# Patient Record
Sex: FEMALE | Employment: UNEMPLOYED | URBAN - METROPOLITAN AREA
[De-identification: names, ages, dates, MRNs, and addresses within clinical notes are randomized per-mention and may not be internally consistent; named-entity substitution may affect disease eponyms.]

---

## 2023-10-08 PROBLEM — I10 HIGH BLOOD PRESSURE: Status: ACTIVE | Noted: 2023-10-08

## 2023-10-08 PROBLEM — C20 RECTAL CANCER (MULTI): Status: ACTIVE | Noted: 2023-10-08

## 2023-10-08 RX ORDER — LOSARTAN POTASSIUM AND HYDROCHLOROTHIAZIDE 12.5; 1 MG/1; MG/1
TABLET ORAL
COMMUNITY
Start: 2015-09-14

## 2023-10-09 ENCOUNTER — EVALUATION (OUTPATIENT)
Dept: OCCUPATIONAL THERAPY | Facility: CLINIC | Age: 63
End: 2023-10-09
Payer: COMMERCIAL

## 2023-10-09 DIAGNOSIS — M25.672 JOINT STIFFNESS OF BOTH ANKLES: ICD-10-CM

## 2023-10-09 DIAGNOSIS — M79.605 BILATERAL LEG AND FOOT PAIN: ICD-10-CM

## 2023-10-09 DIAGNOSIS — M79.672 BILATERAL LEG AND FOOT PAIN: ICD-10-CM

## 2023-10-09 DIAGNOSIS — M79.671 BILATERAL LEG AND FOOT PAIN: ICD-10-CM

## 2023-10-09 DIAGNOSIS — M79.604 BILATERAL LEG AND FOOT PAIN: ICD-10-CM

## 2023-10-09 DIAGNOSIS — M25.671 JOINT STIFFNESS OF BOTH ANKLES: ICD-10-CM

## 2023-10-09 DIAGNOSIS — I89.0 LYMPHEDEMA OF BOTH LOWER EXTREMITIES: Primary | ICD-10-CM

## 2023-10-09 PROCEDURE — 97166 OT EVAL MOD COMPLEX 45 MIN: CPT | Mod: GO

## 2023-10-09 PROCEDURE — 97535 SELF CARE MNGMENT TRAINING: CPT | Mod: GO

## 2023-10-09 ASSESSMENT — ACTIVITIES OF DAILY LIVING (ADL): HOME_MANAGEMENT_TIME_ENTRY: 15

## 2023-10-09 NOTE — LETTER
October 11, 2023     Patient: Elisa Ann   YOB: 1960   Date of Visit: 10/9/2023       To Whom It May Concern:    It is my medical opinion that Elisa Ann {Work release (duty restriction):20886}.    If you have any questions or concerns, please don't hesitate to call.         Sincerely,        Ratna Jones, OT    CC: No Recipients

## 2023-10-09 NOTE — PROGRESS NOTES
Occupational Therapy    Occupational Therapy Evaluation    Name: Elisa Ann  MRN: [id number]  : 1960  Date: 10/09/23  Visit #1   58 OT/PT VS/YR     Plan:    Goals: Goals set and discussed today.   By discharge pt will achieve the following goals:     -Demonstrate decreased swelling and softened tissue texture in BLEs upon visual inspection and palpation to increase safe functional mobility, ADLS. IADLS, and leisure activities.  -Decrease circumferential measurements bilateral lower extremity by 0.5-4.0 cm.  -Demonstrate increased knowledge with lymphedema skin care precautions to reduce the risk of infection and exacerbation.  -Demonstrate independence with the self manual lymph drainage (MLD) to enhance lymphatic flow and decongestion of trunk, both lower extremities.  -Demonstrate independence with self bandaging/compression techniques and independent understanding of the principles and theory of lymphatic compression.  -Be fit with and demonstrate good tolerance to bilateral lower extremity compression garment (to determine style, mmHg) when the patient is stable, at maximal decongestion.  -Demonstrate independence with donning/doffing garment and adherence to wear schedule, adaptive techniques as needed.  -Improve LLIS score by 7-14 points by discharge.  -Decrease pain, tightness lower extremities.  -Improve bilateral LE functional ROM and strength as needed for safe functional mobility.  -Demonstrate independence with home exercise program and compliance with lymphedema exercise precautions.        Intervention plan include: vasopneumatic device , ADLs, compression bandaging , edema control , education/instruction , garment measuring/fitting , home program , IADLs, manual lymphatic drainage , manual therapy , therapeutic activities and therapeutic exercises.   Frequency and duration: 1-2 time(s) a week, for 8-12 weeks.   Potential to achieve rehab goals is good.     Plan of care was developed with input  and agreement by the patient.      Assessment    Pt presents to occupational therapy today with with BLE lymphedema, leg heaviness/tightness/pain, joint stiffness, fatigue impacting her functional mobility, ADLS, IADLS, work and leisure activities.  Standardized testing and measures administered today, including LLIS, reveal that the patient has multiple impairments in body structures and functions, activity limitations, and participation restrictions.   The patient has personal factors and comorbidities that may serve as barriers affecting the plan of care, including h/o venous insufficiency, leg swelling, works full time.  Skilled OT services are warranted in order to realize measurable change in the above outcome measures and achieve improvements in the patient's functional status and individual goals. The patient verbalized understanding and is in agreement with all goals and plan of care.        Clinical Presentation: evolving with changing characteristics   Level of Complexity: moderate   Problems To Be Addressed: decreased ADL performance, decreased IADL performance, decreased play/leisure performance, decreased knowledge of precautions, decreased knowledge of HEP, edema, pain, decreased ROM/joint mobility, decreased strength, impaired sensation/sensibility and lymphedema.      Reason For Visit    Initial Evaluation, Evaluation and Treatment.   Reason for Referral: Lymphedema I89.0  Referred by Dr Russell Henley MD         Subjective      Lymphedema History   History of present illness. Pt presents to OT with BLE lymphedema, leg heaviness/tightness/pain, joint stiffness, fatigue impacting her functional mobility, ADLS, IADLS, work and leisure activities.   Pt reports h/o leg swelling since 2007, has worsened this summer with hot weather. Pt reports leg swelling was inherited. Pt wears knee high OTC compression stockings, though not containing swelling.  Will assess.  Medical history: h/o venous insufficiency,  "has had laser ablations per pt; OA knees; h/o rectal cancer, 2016, surgery with ileostomy, then reversal 2017; h/o bowel blockage with surgery 2020, no colostomy.   Hand Dominance: Right   Affected body part(s) are: bilateral lower extremities.   Pain Scale: Location: lower legs heavy, achy, tight, fatigue when on feet for extended amounts of time.  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   Living Environment, Social Support and Patient Characteristics: two-story condo, no stairs to enter, has 14 steps with railing between levels. Pt lives with her .  Bedroom: 2nd floor   Bathroom: 2nd floor  Pt walks without device.  Pt manages stairs with hand rail support, slower, regular pattern, was using step-to pattern.  Pt manages self cares, home care tasks, modified as needed.    Patient stated goal(s) for treatment include: reducing symptoms . \"Help somewhat with swelling.\"  Medical Screening:   Medical screening assessed   No overt signs of domestic/child or elder abuse .      Objective    Outcome Measures:   Lymphedema Life Impact Scale score: 22, no infections         ROM:   (Range of Motion in degrees)   Additional Information: Ankles tight, stiff.   Strength:   Additional Findings:   Grossly functional. Fatigue, leg heaviness.   Sensation:   Right Lower Extremity: intact   Left Lower Extremity: intact         Lower Extremity (Skin Appearance/Condition and Girth):   Dryness    Fibrotic edema distal lower legs  Pitting edema   Hyperkeratosis creases ankles, mild toes   Hyperpigmentation pink lower legs, dark spots/areas distal ant lower legs bilaterally, pt reports she was told these are pre-ulcer areas.  + Stemmer Sign bilateral 2nd toes   Additional Information: Firm, tight tissue texture distal lower legs, fibrotic, hyperkeratotic, creases.     Lower Extremity Girth Measurements:      RLE cm  Superior border of patella (SBP) 50.0  10 cm above SBP -  10cm below SBP 43.3  20cm below SBP " 45.3  30cm below SBP 38.5  35cm below SBP 33.6  Ankle lobule 32.0  Ankle 28.8  Forefoot 24.5    LLE cm  Superior border of patella (SBP) 47.5  10 cm above SBP -  10cm below SBP 42.0  20cm below SBP 44.4  30cm below SBP 34.9  35cm below SBP 31.5  Ankle lobule 32.6  Ankle 29.5  Forefoot 24.1         Treatment    Time in clinic started at 1415   Time in clinic ended at 1515   Total time in clinic is 60 minutes.   Total timed code time is 15 minutes.     Treatment Performed Today SELF CARE TX: Pt instructed on function of lymphatic system, causes of lymphedema, lymphedema tx/CDT-complete decongestive therapy which includes skin care, manual lymph drainage (MLD), compression and exercise. NLN hand out issued.   Evaluation Code: 45 min(s). 93657 - OT Eval Mod Complexity   Timed: 36864 Self Care Management Training, 1 unit(s), 15 min(s).

## 2023-10-09 NOTE — LETTER
October 11, 2023     Patient: Elisa Ann   YOB: 1960   Date of Visit: 10/9/2023       To Whom it May Concern:    Elisa Ann was seen in my clinic on 10/9/2023. She {Return to school/sport:83659}.    If you have any questions or concerns, please don't hesitate to call.         Sincerely,          Ratna Jones OT        CC: No Recipients

## 2023-10-10 PROBLEM — M79.605 BILATERAL LEG AND FOOT PAIN: Status: ACTIVE | Noted: 2023-10-10

## 2023-10-10 PROBLEM — M79.672 BILATERAL LEG AND FOOT PAIN: Status: ACTIVE | Noted: 2023-10-10

## 2023-10-10 PROBLEM — M25.671 JOINT STIFFNESS OF BOTH ANKLES: Status: ACTIVE | Noted: 2023-10-10

## 2023-10-10 PROBLEM — M79.604 BILATERAL LEG AND FOOT PAIN: Status: ACTIVE | Noted: 2023-10-10

## 2023-10-10 PROBLEM — M25.672 JOINT STIFFNESS OF BOTH ANKLES: Status: ACTIVE | Noted: 2023-10-10

## 2023-10-10 PROBLEM — I89.0 LYMPHEDEMA OF BOTH LOWER EXTREMITIES: Status: ACTIVE | Noted: 2023-10-10

## 2023-10-10 PROBLEM — M79.671 BILATERAL LEG AND FOOT PAIN: Status: ACTIVE | Noted: 2023-10-10

## 2023-10-10 ASSESSMENT — ENCOUNTER SYMPTOMS
DEPRESSION: 0
OCCASIONAL FEELINGS OF UNSTEADINESS: 0
LOSS OF SENSATION IN FEET: 0

## 2023-10-31 ENCOUNTER — APPOINTMENT (OUTPATIENT)
Dept: OCCUPATIONAL THERAPY | Facility: CLINIC | Age: 63
End: 2023-10-31
Payer: COMMERCIAL

## 2023-11-07 ENCOUNTER — APPOINTMENT (OUTPATIENT)
Dept: OCCUPATIONAL THERAPY | Facility: CLINIC | Age: 63
End: 2023-11-07
Payer: MEDICARE

## 2023-11-13 ENCOUNTER — TREATMENT (OUTPATIENT)
Dept: OCCUPATIONAL THERAPY | Facility: CLINIC | Age: 63
End: 2023-11-13
Payer: MEDICARE

## 2023-11-13 DIAGNOSIS — I89.0 LYMPHEDEMA: ICD-10-CM

## 2023-11-13 DIAGNOSIS — I89.0 LYMPHEDEMA OF BOTH LOWER EXTREMITIES: Primary | ICD-10-CM

## 2023-11-13 DIAGNOSIS — M79.605 BILATERAL LEG AND FOOT PAIN: ICD-10-CM

## 2023-11-13 DIAGNOSIS — M79.672 BILATERAL LEG AND FOOT PAIN: ICD-10-CM

## 2023-11-13 DIAGNOSIS — M25.672 JOINT STIFFNESS OF BOTH ANKLES: ICD-10-CM

## 2023-11-13 DIAGNOSIS — M25.671 JOINT STIFFNESS OF BOTH ANKLES: ICD-10-CM

## 2023-11-13 DIAGNOSIS — M62.81 GENERALIZED MUSCLE WEAKNESS: ICD-10-CM

## 2023-11-13 DIAGNOSIS — M79.604 BILATERAL LEG AND FOOT PAIN: ICD-10-CM

## 2023-11-13 DIAGNOSIS — M79.671 BILATERAL LEG AND FOOT PAIN: ICD-10-CM

## 2023-11-13 PROCEDURE — 97140 MANUAL THERAPY 1/> REGIONS: CPT | Mod: GO

## 2023-11-13 PROCEDURE — 97535 SELF CARE MNGMENT TRAINING: CPT | Mod: GO

## 2023-11-13 ASSESSMENT — ACTIVITIES OF DAILY LIVING (ADL): HOME_MANAGEMENT_TIME_ENTRY: 30

## 2023-11-13 NOTE — PROGRESS NOTES
Occupational Therapy    Occupational Therapy Treatment    Name: Elisa Ann  MRN: [id number]  : 1960  Date: 23  Visit #2 (#1 Medicare)    1. Lymphedema of both lower extremities        2. Lymphedema  Follow Up In Occupational Therapy      3. Bilateral leg and foot pain        4. Joint stiffness of both ankles        5. Generalized muscle weakness            Assessment:  BLE tissue texture softening post tx.  Pt reported her legs felt lighter.     Plan:  Continue OT 1-2x/week for trunk, BLE lymphedema CDT- complete decongestive therapy, to decrease swelling, pain, tightness, improve functional mobility, instruct home management strategies.   Continue per goals:  By discharge pt will achieve the following goals:     -Demonstrate decreased swelling and softened tissue texture in BLEs upon visual inspection and palpation to increase safe functional mobility, ADLS. IADLS, and leisure activities.  -Decrease circumferential measurements bilateral lower extremity by 0.5-4.0 cm.  -Demonstrate increased knowledge with lymphedema skin care precautions to reduce the risk of infection and exacerbation.  -Demonstrate independence with the self manual lymph drainage (MLD) to enhance lymphatic flow and decongestion of trunk, both lower extremities.  -Demonstrate independence with self bandaging/compression techniques and independent understanding of the principles and theory of lymphatic compression.  -Be fit with and demonstrate good tolerance to bilateral lower extremity compression garment (to determine style, mmHg) when the patient is stable, at maximal decongestion.  -Demonstrate independence with donning/doffing garment and adherence to wear schedule, adaptive techniques as needed.  -Improve LLIS score by 7-14 points by discharge.  -Decrease pain, tightness lower extremities.  -Improve bilateral LE functional ROM and strength as needed for safe functional mobility.  -Demonstrate independence with home exercise  program and compliance with lymphedema exercise precautions.     Subjective   General:   Pt wearing OTC knee high stockings, will assess.  Pt missed appointments due to work and insurance changes.     Pain Assessment:  Pt reports leg fatigue, swelling, discomfort, number not given.     Objective    Lower Extremity (Skin Appearance/Condition and Girth):   Dryness    Fibrotic edema distal lower legs  Pitting edema   Hyperkeratosis creases ankles, mild toes   Hyperpigmentation pink lower legs, + Stemmer Sign bilateral 2nd toes   Additional Information: Firm, tight tissue texture distal lower legs, fibrotic, hyperkeratotic, creases.     Lower Extremity Girth Measurements:      RLE cm  Superior border of patella (SBP) 48.8  10 cm above SBP -  10cm below SBP 42.5  20cm below SBP 42.4  30cm below SBP 35.3  35cm below SBP 31.4  Ankle lobule 29.4  Ankle 27.9  Forefoot 23.8    LLE cm  Superior border of patella (SBP) 47.2  10 cm above SBP -  10cm below SBP 41.2  20cm below SBP 42.3  30cm below SBP 33.8  35cm below SBP 30.4  Ankle lobule 28.9  Ankle 27.4  Forefoot 25.4  Decreases since initial measurements 10/09/2023    Time in 1530  Time out 1630  Total time 60 minutes     Treatment:  Manual Therapy 30  OT develop manual lymph drainage (MLD) sequence (bilateral trunk, BLEs).  OT provide MLD tx, instruct/demo hand techniques with tx.  Pt tolerated MLD well.  BLE tissue texture softening post tx.    Self Care 30  OT assess skin, take and assess measurements.  OT instruct rationale of MLD, instruct sequence, technique for HEP.  OT initiate development of MLD written home program with pt input (completed trunk). Issued to pt. Will complete next session.

## 2023-11-13 NOTE — LETTER
November 16, 2023    Taisha Henley MD  [address] [facility] [address]  [facility] [address]    Patient: Elisa Ann   YOB: 1960   Date of Visit: 11/13/2023       Dear Taisha Henley Md  [address] [address] [address]    The attached plan of care is being sent to you because your patient’s medical reimbursement requires that you certify the plan of care. Your signature is required to allow uninterrupted insurance coverage.      You may indicate your approval by signing below and faxing this form back to us at Dept Fax: [phone].    Please call Dept: [phone] with any questions or concerns.    Thank you for this referral,        Ratna Jones OT  [facility] [address] [facility]  [address] [facility] [address]    Payer: Payor: [employer]ICARE / Plan: [employer] MEDICARE / Product Type: *No Product type* /                                                                         Date:     Dear Ratna Jones OT,     Re: Ms. Elisa Ann, MRN:[id number]    I certify that I have reviewed the attached plan of care and it is medically necessary for Ms. Elisa Ann (1960) who is under my care.          ______________________________________                    _________________  Provider name and credentials                                           Date and time                                                                                           Plan of Care 11/13/23   Effective from: 11/13/2023  Effective to: 2/13/2024    Plan ID: [id number]            Participants as of Finalize on 11/16/2023    Name Type Comments Contact Info    Taisha Henley MD Referring Provider  [phone]    Ratna Jones OT Occupational Therapist         Last Plan Note     Author: Ratna Jones OT Status: Incomplete Last edited: 11/13/2023  3:30 PM       Occupational Therapy    Occupational Therapy Treatment    Name: Elisa  Pejic  MRN: [id number]  : 1960  Date: 23  Visit #2 (#1 Medicare)    1. Lymphedema of both lower extremities        2. Lymphedema  Follow Up In Occupational Therapy      3. Bilateral leg and foot pain        4. Joint stiffness of both ankles        5. Generalized muscle weakness            Assessment:  BLE tissue texture softening post tx.  Pt reported her legs felt lighter.     Plan:  Continue OT 1-2x/week for trunk, BLE lymphedema CDT- complete decongestive therapy, to decrease swelling, pain, tightness, improve functional mobility, instruct home management strategies.   Continue per goals:  By discharge pt will achieve the following goals:     -Demonstrate decreased swelling and softened tissue texture in BLEs upon visual inspection and palpation to increase safe functional mobility, ADLS. IADLS, and leisure activities.  -Decrease circumferential measurements bilateral lower extremity by 0.5-4.0 cm.  -Demonstrate increased knowledge with lymphedema skin care precautions to reduce the risk of infection and exacerbation.  -Demonstrate independence with the self manual lymph drainage (MLD) to enhance lymphatic flow and decongestion of trunk, both lower extremities.  -Demonstrate independence with self bandaging/compression techniques and independent understanding of the principles and theory of lymphatic compression.  -Be fit with and demonstrate good tolerance to bilateral lower extremity compression garment (to determine style, mmHg) when the patient is stable, at maximal decongestion.  -Demonstrate independence with donning/doffing garment and adherence to wear schedule, adaptive techniques as needed.  -Improve LLIS score by 7-14 points by discharge.  -Decrease pain, tightness lower extremities.  -Improve bilateral LE functional ROM and strength as needed for safe functional mobility.  -Demonstrate independence with home exercise program and compliance with lymphedema exercise precautions.      Subjective  General:   Pt wearing OTC knee high stockings, will assess.  Pt missed appointments due to work and insurance changes.     Pain Assessment:  Pt reports leg fatigue, swelling, discomfort, number not given.     Objective   Lower Extremity (Skin Appearance/Condition and Girth):   Dryness    Fibrotic edema distal lower legs  Pitting edema   Hyperkeratosis creases ankles, mild toes   Hyperpigmentation pink lower legs, + Stemmer Sign bilateral 2nd toes   Additional Information: Firm, tight tissue texture distal lower legs, fibrotic, hyperkeratotic, creases.     Lower Extremity Girth Measurements:      RLE cm  Superior border of patella (SBP) 48.8  10 cm above SBP -  10cm below SBP 42.5  20cm below SBP 42.4  30cm below SBP 35.3  35cm below SBP 31.4  Ankle lobule 29.4  Ankle 27.9  Forefoot 23.8    LLE cm  Superior border of patella (SBP) 47.2  10 cm above SBP -  10cm below SBP 41.2  20cm below SBP 42.3  30cm below SBP 33.8  35cm below SBP 30.4  Ankle lobule 28.9  Ankle 27.4  Forefoot 25.4  Decreases since initial measurements 10/09/2023    Time in 1530  Time out 1630  Total time 60 minutes     Treatment:  Manual Therapy 30  OT develop manual lymph drainage (MLD) sequence (bilateral trunk, BLEs).  OT provide MLD tx, instruct/demo hand techniques with tx.  Pt tolerated MLD well.  BLE tissue texture softening post tx.    Self Care 30  OT assess skin, take and assess measurements.  OT instruct rationale of MLD, instruct sequence, technique for HEP.  OT initiate development of MLD written home program with pt input (completed trunk). Issued to pt. Will complete next session.                       Current Participants as of 11/16/2023    Name Type Comments Contact Info    Taisha Henley MD Referring Provider  [phone]    Signature pending    Ratna Jones OT Occupational Therapist

## 2023-11-20 ENCOUNTER — TREATMENT (OUTPATIENT)
Dept: OCCUPATIONAL THERAPY | Facility: CLINIC | Age: 63
End: 2023-11-20
Payer: MEDICARE

## 2023-11-20 DIAGNOSIS — M25.672 JOINT STIFFNESS OF BOTH ANKLES: ICD-10-CM

## 2023-11-20 DIAGNOSIS — M79.671 BILATERAL LEG AND FOOT PAIN: ICD-10-CM

## 2023-11-20 DIAGNOSIS — I89.0 LYMPHEDEMA OF BOTH LOWER EXTREMITIES: Primary | ICD-10-CM

## 2023-11-20 DIAGNOSIS — I89.0 LYMPHEDEMA: ICD-10-CM

## 2023-11-20 DIAGNOSIS — M25.671 JOINT STIFFNESS OF BOTH ANKLES: ICD-10-CM

## 2023-11-20 DIAGNOSIS — M79.604 BILATERAL LEG AND FOOT PAIN: ICD-10-CM

## 2023-11-20 DIAGNOSIS — M62.81 GENERALIZED MUSCLE WEAKNESS: ICD-10-CM

## 2023-11-20 DIAGNOSIS — M79.672 BILATERAL LEG AND FOOT PAIN: ICD-10-CM

## 2023-11-20 DIAGNOSIS — M79.605 BILATERAL LEG AND FOOT PAIN: ICD-10-CM

## 2023-11-20 PROCEDURE — 97535 SELF CARE MNGMENT TRAINING: CPT | Mod: GO | Performed by: OCCUPATIONAL THERAPIST

## 2023-11-20 PROCEDURE — 97140 MANUAL THERAPY 1/> REGIONS: CPT | Mod: GO | Performed by: OCCUPATIONAL THERAPIST

## 2023-11-20 ASSESSMENT — ACTIVITIES OF DAILY LIVING (ADL): HOME_MANAGEMENT_TIME_ENTRY: 17

## 2023-11-20 NOTE — PROGRESS NOTES
Occupational Therapy    Occupational Therapy Treatment    Name: Elisa Ann  MRN: [id number]  : 1960  Date: 23    Visit #3 (#2 Medicare)    1. Lymphedema of both lower extremities        2. Lymphedema  Follow Up In Occupational Therapy      3. Bilateral leg and foot pain        4. Joint stiffness of both ankles        5. Generalized muscle weakness            Assessment:  OT educated patient on correct donning techniques of short stretch bandages.  Patient expressed carryover. Will further assess    Plan:  Continue OT 1-2x/week for trunk, BLE lymphedema CDT- complete decongestive therapy, to decrease swelling, pain, tightness, improve functional mobility, instruct home management strategies.   Continue per goals:      Subjective   General:  Patient reports she has been completing issued manual lymph drainage home program, but has some questions.      Objective      Lower Extremity (Skin Appearance/Condition and Girth):   Dryness    Fibrotic edema distal lower legs  Pitting edema   Hyperkeratosis creases ankles, mild toes   Hyperpigmentation pink lower legs, + Stemmer Sign bilateral 2nd toes   Additional Information: Firm, tight tissue texture distal lower legs, fibrotic, hyperkeratotic, creases.     No measurements on today's date     Time in 1503  Time out 1600  Total time 57 minutes     Treatment:  Manual Therapy 40  Pt requests further clarification on manual lymph drainage (MLD) sequence.  OT provided manual lymph drainage (MLD) to RLE; while OT completed MLD, OT reviewed sequencing and hand techniques with patient from MLD home program. Pt expressed increased understanding.  While OT completed MLD to RLE, OT applied lymphapress to LLE at 40 mmHg.     Self Care 17  OT assessed skin  OT educated pt on rationale of short stretch bandages and benefit for decongestion of BLEs.  Pt to trial them  OT applied short stretch bandages to LLE from foot to knee   -4” stockinette base layer, 1-6  and 1-12cm short  stretch bandage.   *Pt unable to don shoe with bandage.  OT removed bandages and educated pt to trial use of bandage at home.  OT educated pt on donning techniques.   OT reviewed post bandaging precautions, handout issued. -Pt expressed teach back of education

## 2023-11-27 ENCOUNTER — TREATMENT (OUTPATIENT)
Dept: OCCUPATIONAL THERAPY | Facility: CLINIC | Age: 63
End: 2023-11-27
Payer: MEDICARE

## 2023-11-27 DIAGNOSIS — I89.0 LYMPHEDEMA OF BOTH LOWER EXTREMITIES: Primary | ICD-10-CM

## 2023-11-27 DIAGNOSIS — M79.605 BILATERAL LEG AND FOOT PAIN: ICD-10-CM

## 2023-11-27 DIAGNOSIS — M62.81 GENERALIZED MUSCLE WEAKNESS: ICD-10-CM

## 2023-11-27 DIAGNOSIS — M79.671 BILATERAL LEG AND FOOT PAIN: ICD-10-CM

## 2023-11-27 DIAGNOSIS — M79.672 BILATERAL LEG AND FOOT PAIN: ICD-10-CM

## 2023-11-27 DIAGNOSIS — M25.671 JOINT STIFFNESS OF BOTH ANKLES: ICD-10-CM

## 2023-11-27 DIAGNOSIS — M25.672 JOINT STIFFNESS OF BOTH ANKLES: ICD-10-CM

## 2023-11-27 DIAGNOSIS — I89.0 LYMPHEDEMA: ICD-10-CM

## 2023-11-27 DIAGNOSIS — M79.604 BILATERAL LEG AND FOOT PAIN: ICD-10-CM

## 2023-11-27 PROCEDURE — 97140 MANUAL THERAPY 1/> REGIONS: CPT | Mod: GO | Performed by: OCCUPATIONAL THERAPIST

## 2023-11-27 PROCEDURE — 97535 SELF CARE MNGMENT TRAINING: CPT | Mod: GO | Performed by: OCCUPATIONAL THERAPIST

## 2023-11-27 ASSESSMENT — ACTIVITIES OF DAILY LIVING (ADL): HOME_MANAGEMENT_TIME_ENTRY: 28

## 2023-11-27 NOTE — PROGRESS NOTES
Occupational Therapy    Occupational Therapy Treatment    Name: Elisa Ann  MRN: [id number]  : 1960  Date: 23    Visit #4 (#3 Medicare)    1. Lymphedema of both lower extremities        2. Lymphedema  Follow Up In Occupational Therapy      3. Bilateral leg and foot pain        4. Joint stiffness of both ankles        5. Generalized muscle weakness            Assessment:  Patient with noted decreased circumferential measurements throughout RLE post use of lymphapress.  Patient expressed comfort with use of device     Plan:  Continue OT 1-2x/week for trunk, BLE lymphedema CDT- complete decongestive therapy, to decrease swelling, pain, tightness, improve functional mobility, instruct home management strategies.   Continue per goals:      Subjective   General:  Patient she was unable to wear short stretch bandages but continues to utilize her OTC compression socks and completes self manual lymph drainage regularly      Objective      Lower Extremity (Skin Appearance/Condition and Girth):   Dryness    Fibrotic edema distal lower legs  Pitting edema   Hyperkeratosis creases ankles, mild toes   Hyperpigmentation pink lower legs, + Stemmer Sign bilateral 2nd toes   Additional Information: Firm, tight tissue texture distal lower legs, fibrotic, hyperkeratotic, creases.     RLE  Superior border of patella (SBP) 48.4  10 cm above SBP -  10cm below SBP 41.4  20cm below SBP 42.2  30cm below SBP 34.9  35cm below SBP 31.5  Ankle lobule 29.9  Ankle 26.0  Forefoot 23.6     LLE cm  Superior border of patella (SBP) 46.8  10 cm above SBP -  10cm below SBP 40.0  20cm below SBP 42.3  30cm below SBP 33.0  35cm below SBP 30.0  Ankle lobule 27.9  Ankle 26.1  Forefoot 23.3  Decreases on today's date     RLE post use of lymphapress   Superior border of patella (SBP) 46.0  10 cm above SBP -  10cm below SBP 40.0  20cm below SBP 40.5  30cm below SBP 34.9  35cm below SBP 30.8  Ankle lobule 27.0  Ankle 25.9  Forefoot 22.5   Decreases  post use of lymphapress     Time in 1507  Time out 1600  Total time 53 minutes     Treatment:  Manual Therapy 25  OT applied lymphapress to RLE at 40 mmHg. Simultaneously, OT provided manual lymph drainage (MLD) to LLE       Self Care 28  OT assessed skin and took circumferential measurements of BLE (RLE pre and post use of lymphapress)     Pt continues compression garments daily   Pt continues Manual lymph drainage (MLD) daily.  Pt monitors amount of sodium in diet, OT educates pt on sodium intake per lymphedema protocol, pt expresses carryover   Pt elevates B LE while sleeping in night and as able throughout the day.   Pt states completes issued exercises at home

## 2023-12-12 ENCOUNTER — TREATMENT (OUTPATIENT)
Dept: OCCUPATIONAL THERAPY | Facility: CLINIC | Age: 63
End: 2023-12-12
Payer: COMMERCIAL

## 2023-12-12 DIAGNOSIS — M25.672 JOINT STIFFNESS OF BOTH ANKLES: ICD-10-CM

## 2023-12-12 DIAGNOSIS — M79.672 BILATERAL LEG AND FOOT PAIN: ICD-10-CM

## 2023-12-12 DIAGNOSIS — I89.0 LYMPHEDEMA OF BOTH LOWER EXTREMITIES: Primary | ICD-10-CM

## 2023-12-12 DIAGNOSIS — M79.605 BILATERAL LEG AND FOOT PAIN: ICD-10-CM

## 2023-12-12 DIAGNOSIS — M79.671 BILATERAL LEG AND FOOT PAIN: ICD-10-CM

## 2023-12-12 DIAGNOSIS — M62.81 GENERALIZED MUSCLE WEAKNESS: ICD-10-CM

## 2023-12-12 DIAGNOSIS — M79.604 BILATERAL LEG AND FOOT PAIN: ICD-10-CM

## 2023-12-12 DIAGNOSIS — M25.671 JOINT STIFFNESS OF BOTH ANKLES: ICD-10-CM

## 2023-12-12 PROCEDURE — 97535 SELF CARE MNGMENT TRAINING: CPT | Mod: GO,CO

## 2023-12-12 PROCEDURE — 97110 THERAPEUTIC EXERCISES: CPT | Mod: GO,CO

## 2023-12-12 ASSESSMENT — ACTIVITIES OF DAILY LIVING (ADL): HOME_MANAGEMENT_TIME_ENTRY: 10

## 2023-12-12 NOTE — PROGRESS NOTES
Occupational TherapyOccupational Therapy    Occupational Therapy Treatment    Name: Elisa Ann  MRN: [id number]  : 1960  Date: 23    Time Calculation  Start Time: 1533  Stop Time: 1611  Time Calculation (min): 38 min(  Visit 5 (#4 Medicare)    1. Lymphedema of both lower extremities        2. Generalized muscle weakness        3. Joint stiffness of both ankles        4. Bilateral leg and foot pain                Assessment:  Pt appears to tolerate treatment well with no complaints of pain. Pt demonstrates good ability to complete home exercise program. Mild increase in measurements B LE this date.     Plan:  Continue OT 1-2x/week for trunk, BLE lymphedema CDT- complete decongestive therapy, to decrease swelling, pain, tightness, improve functional mobility, instruct home management strategies.   Continue per goals:      Subjective   General:  Pt states she her swelling is getting better, notices a difference between when she wears compression and does her manual lymph drainage. Pt states she is not wrapping.      Objective      Lower Extremity (Skin Appearance/Condition and Girth):   Dryness    Fibrotic edema distal lower legs  Pitting edema   Hyperkeratosis creases ankles, mild toes   Hyperpigmentation pink lower legs, + Stemmer Sign bilateral 2nd toes   Additional Information: Firm, tight tissue texture distal lower legs, fibrotic, hyperkeratotic, creases.     RLE  Superior border of patella (SBP) 49.9  10 cm above SBP -  10cm below SBP 40.6  20cm below SBP 41.4  30cm below SBP 36.2  35cm below SBP 32.4  Ankle lobule 27.7  Ankle 25.5  Forefoot 23.9     LLE cm  Superior border of patella (SBP) 47.8  10 cm above SBP -  10cm below SBP 40.5  20cm below SBP 42.2  30cm below SBP 34.7  35cm below SBP 31.4  Ankle lobule 28.0  Ankle 26.0  Forefoot 22.3    Treatment:  Ther Ex 28  Nustep 10 minutes level 3  Seated march 3x10  Seated hip adduction 1x15  Seated hip abduction 1x15  Seated glute sets 1x15  Home  exercise program reviewed and issued for home       Self Care 10  TOSCANO assessed skin and took circumferential measurements of BLE    Pt continues compression garments daily   Pt continues Manual lymph drainage (MLD) daily.  Pt monitors amount of sodium in diet, OT educates pt on sodium intake per lymphedema protocol, pt expresses carryover   Pt elevates B LE while sleeping in night and as able throughout the day.   Pt states completes issued exercises at home

## 2023-12-21 ENCOUNTER — TREATMENT (OUTPATIENT)
Dept: OCCUPATIONAL THERAPY | Facility: CLINIC | Age: 63
End: 2023-12-21
Payer: COMMERCIAL

## 2023-12-21 DIAGNOSIS — M79.604 BILATERAL LEG AND FOOT PAIN: ICD-10-CM

## 2023-12-21 DIAGNOSIS — M79.672 BILATERAL LEG AND FOOT PAIN: ICD-10-CM

## 2023-12-21 DIAGNOSIS — M62.81 GENERALIZED MUSCLE WEAKNESS: ICD-10-CM

## 2023-12-21 DIAGNOSIS — M25.671 JOINT STIFFNESS OF BOTH ANKLES: ICD-10-CM

## 2023-12-21 DIAGNOSIS — M79.671 BILATERAL LEG AND FOOT PAIN: ICD-10-CM

## 2023-12-21 DIAGNOSIS — M25.672 JOINT STIFFNESS OF BOTH ANKLES: ICD-10-CM

## 2023-12-21 DIAGNOSIS — M79.605 BILATERAL LEG AND FOOT PAIN: ICD-10-CM

## 2023-12-21 DIAGNOSIS — I89.0 LYMPHEDEMA OF BOTH LOWER EXTREMITIES: Primary | ICD-10-CM

## 2023-12-21 PROCEDURE — 97110 THERAPEUTIC EXERCISES: CPT | Mod: GO

## 2023-12-21 PROCEDURE — 97535 SELF CARE MNGMENT TRAINING: CPT | Mod: GO

## 2023-12-21 PROCEDURE — 97140 MANUAL THERAPY 1/> REGIONS: CPT | Mod: GO

## 2023-12-21 ASSESSMENT — ACTIVITIES OF DAILY LIVING (ADL): HOME_MANAGEMENT_TIME_ENTRY: 25

## 2023-12-21 NOTE — PROGRESS NOTES
"Occupational Therapy    Occupational Therapy Treatment    [facility]  MRN: [id number]  : 1960  Date: 23  Visit # 6 (5 Medicare)  Time Calculation  Start Time: 1440  Stop Time: 1535  Time Calculation (min): 55 min      1. Lymphedema of both lower extremities        2. Bilateral leg and foot pain        3. Joint stiffness of both ankles        4. Generalized muscle weakness            Assessment:  Pt appears to tolerate treatment well with no complaints of pain.   BLE tissue texture softening post tx.  Pt report her legs \"feel lighter\".    Plan:  Continue OT 1-2x/week for trunk, BLE lymphedema CDT- complete decongestive therapy, to decrease swelling, pain, tightness, improve functional mobility, instruct home management strategies.       Subjective   General:  Pt states she her legs feel better, less swollen, pt reports not having pain.  Pt reports pump approved. Pt will check status with company.     Objective      Lower Extremity (Skin Appearance/Condition and Girth):   Dryness    Fibrotic edema distal lower legs  Pitting edema   Hyperkeratosis creases ankles, mild toes   Hyperpigmentation pink lower legs, + Stemmer Sign bilateral 2nd toes   Additional Information: Firm, tight tissue texture distal lower legs, fibrotic, hyperkeratotic, creases. Legs showing softening.     Circumferential Measurements cm  RLE  Superior border of patella (SBP) 50.7  10 cm above SBP -  10cm below SBP 40.8  20cm below SBP 41.7  30cm below SBP 34.9  35cm below SBP 30.8  Ankle lobule 28.3  Ankle 26.4  Forefoot 23.1     LLE cm  Superior border of patella (SBP) 48.1  10 cm above SBP -  10cm below SBP 39.5  20cm below SBP 41.6  30cm below SBP 33.4  35cm below SBP 29.9  Ankle lobule 28.3  Ankle 26.6  Forefoot 23.9  Decreases noted.    Treatment:  55 minutes    Manual Therapy: 15  OT provide MLD tx, bilat trunk, BLEs.  OT refine pt sequence and techniques.  Good tissue texture softening post tx.    Ther Ex  15  Nustep " 10 minutes level 3  OT review pt home exercises instructed last session:  Seated march, hip abduction, adduction, glute sets.  Pt reports following exercises.    Self Care:  25  Pt reports she received approval for pump for home.  She is awaiting delivery and training.  OT assess skin, tissue texture, take and assess measurements.   Pt wearing light OTC compression knee highs. OT instruct lymphedema grade stockings, will transition to these when pt at max decongestion.  Pt does not want to wear short stretch wraps, these were difficult per pt.  Home management:  Pt continues compression garments daily   Pt continues Manual lymph drainage (MLD) daily.  Pt monitors amount of sodium in diet.  Pt elevates B LE while sleeping in night and as able throughout the day.   Pt states completes issued exercises at home

## 2023-12-26 ENCOUNTER — TREATMENT (OUTPATIENT)
Dept: OCCUPATIONAL THERAPY | Facility: CLINIC | Age: 63
End: 2023-12-26
Payer: COMMERCIAL

## 2023-12-26 DIAGNOSIS — M79.605 BILATERAL LEG AND FOOT PAIN: ICD-10-CM

## 2023-12-26 DIAGNOSIS — M79.604 BILATERAL LEG AND FOOT PAIN: ICD-10-CM

## 2023-12-26 DIAGNOSIS — M25.671 JOINT STIFFNESS OF BOTH ANKLES: ICD-10-CM

## 2023-12-26 DIAGNOSIS — M62.81 GENERALIZED MUSCLE WEAKNESS: ICD-10-CM

## 2023-12-26 DIAGNOSIS — M79.671 BILATERAL LEG AND FOOT PAIN: ICD-10-CM

## 2023-12-26 DIAGNOSIS — M79.672 BILATERAL LEG AND FOOT PAIN: ICD-10-CM

## 2023-12-26 DIAGNOSIS — I89.0 LYMPHEDEMA OF BOTH LOWER EXTREMITIES: Primary | ICD-10-CM

## 2023-12-26 DIAGNOSIS — M25.672 JOINT STIFFNESS OF BOTH ANKLES: ICD-10-CM

## 2023-12-26 PROCEDURE — 97535 SELF CARE MNGMENT TRAINING: CPT | Mod: GO,CO

## 2023-12-26 PROCEDURE — 97140 MANUAL THERAPY 1/> REGIONS: CPT | Mod: GO,CO

## 2023-12-26 PROCEDURE — 97110 THERAPEUTIC EXERCISES: CPT | Mod: GO,CO

## 2023-12-26 ASSESSMENT — ACTIVITIES OF DAILY LIVING (ADL): HOME_MANAGEMENT_TIME_ENTRY: 10

## 2023-12-26 NOTE — PROGRESS NOTES
"Occupational Therapy    Occupational Therapy Treatment    [facility]  [facility]: [id number]  : 1960  Date: 23  Visit # 7 (6 Medicare)    Time Calculation  Start Time: 1456  Stop Time: 1551  Time Calculation (min): 55 min      1. Lymphedema of both lower extremities        2. Generalized muscle weakness        3. Joint stiffness of both ankles        4. Bilateral leg and foot pain                Assessment:  Pt appears to tolerate treatment well with no complaints of pain.   BLE tissue texture softening post tx.  Pt report her legs \"feel lighter\".    Plan:  Continue OT 1-2x/week for trunk, BLE lymphedema CDT- complete decongestive therapy, to decrease swelling, pain, tightness, improve functional mobility, instruct home management strategies.       Subjective   General:  Pt reports no pain, no changes B LE since last session, continues home exercises and MLD. Pt states the pump company is coming tomorrow to set up the pump for home use.      Objective      Lower Extremity (Skin Appearance/Condition and Girth):   Dryness    Fibrotic edema distal lower legs  Pitting edema   Hyperkeratosis creases ankles, mild toes   Hyperpigmentation pink lower legs, + Stemmer Sign bilateral 2nd toes   Additional Information: Firm, tight tissue texture distal lower legs, fibrotic, hyperkeratotic, creases. Legs showing softening.         Treatment:  55 minutes    Manual Therapy: 35  OT provide MLD tx, bilat trunk, BLEs.  OT refine pt sequence and techniques.  Good tissue texture softening post tx.    Ther Ex 10  Nustep 10 minutes level 3      Self Care: 10  TOSCANO assesses skin, educates pt on continued home exercise, pt verbalizes understanding.   Home management:  Pt continues compression garments daily   Pt continues Manual lymph drainage (MLD) daily.  Pt monitors amount of sodium in diet.  Pt elevates B LE while sleeping in night and as able throughout the day.   Pt states completes issued exercises at home      "

## 2024-01-02 ENCOUNTER — TREATMENT (OUTPATIENT)
Dept: OCCUPATIONAL THERAPY | Facility: CLINIC | Age: 64
End: 2024-01-02
Payer: COMMERCIAL

## 2024-01-02 DIAGNOSIS — M79.671 BILATERAL LEG AND FOOT PAIN: ICD-10-CM

## 2024-01-02 DIAGNOSIS — M79.604 BILATERAL LEG AND FOOT PAIN: ICD-10-CM

## 2024-01-02 DIAGNOSIS — M79.672 BILATERAL LEG AND FOOT PAIN: ICD-10-CM

## 2024-01-02 DIAGNOSIS — M79.605 BILATERAL LEG AND FOOT PAIN: ICD-10-CM

## 2024-01-02 DIAGNOSIS — M62.81 GENERALIZED MUSCLE WEAKNESS: ICD-10-CM

## 2024-01-02 DIAGNOSIS — M25.672 JOINT STIFFNESS OF BOTH ANKLES: ICD-10-CM

## 2024-01-02 DIAGNOSIS — I89.0 LYMPHEDEMA OF BOTH LOWER EXTREMITIES: Primary | ICD-10-CM

## 2024-01-02 DIAGNOSIS — M25.671 JOINT STIFFNESS OF BOTH ANKLES: ICD-10-CM

## 2024-01-02 PROCEDURE — 97140 MANUAL THERAPY 1/> REGIONS: CPT | Mod: GO

## 2024-01-02 PROCEDURE — 97535 SELF CARE MNGMENT TRAINING: CPT | Mod: GO

## 2024-01-02 ASSESSMENT — ACTIVITIES OF DAILY LIVING (ADL): HOME_MANAGEMENT_TIME_ENTRY: 25

## 2024-01-02 NOTE — PROGRESS NOTES
"Occupational Therapy    Occupational Therapy Treatment    Name: Elisa Reyes  MRN: [id number]  : 1960  Date: 2023  Visit #  Time Calculation  Start Time: 153  Stop Time:   Time Calculation (min): 55 min8 (7 Medicare)      1. Lymphedema of both lower extremities        2. Bilateral leg and foot pain        3. Joint stiffness of both ankles        4. Generalized muscle weakness          Assessment:  BLE tissue texture softening post tx.    Pt report her legs \"feel lighter\".  Pt to transition to compression stockings.    Plan:  Continue OT for trunk, BLE lymphedema CDT- complete decongestive therapy, to decrease swelling, pain, tightness, improve functional mobility, instruct home management strategies. Transition to compression stockings.    Subjective   General:  Pt received her lymphedema pump Lymphapress). Pt has was trained, and has started using it. Pt reports it is helping her legs.     Objective      Lower Extremity (Skin Appearance/Condition and Girth):   Dryness    Fibrotic edema distal lower legs  Pitting edema   Hyperkeratosis creases ankles, mild toes   Hyperpigmentation pink lower legs, + Stemmer Sign bilateral 2nd toes   Additional Information: Firm, tight tissue texture distal lower legs, fibrotic, hyperkeratotic, creases. Legs showing softening.     Leg Circumferential Measurements cm  RLE  Superior border of patella (SBP) 48.7  10 cm above SBP -  10cm below SBP 41.4  20cm below SBP 41.9  30cm below SBP 35.0  35cm below SBP 31.2  Ankle lobule 28.6  Ankle 27.1  Forefoot 22.8     LLE cm  Superior border of patella (SBP) 48.5  10 cm above SBP -  10cm below SBP 40.2  20cm below SBP 41.6  30cm below SBP 33.4  35cm below SBP 30.6  Ankle lobule 27.8  Ankle 26.4  Forefoot 22.8    Treatment:  55 minutes    Manual Therapy: 30  OT provide MLD tx, bilat trunk, BLEs.    OT refine pt sequence and techniques with good teach back.  Good tissue texture softening post tx.    Self Care: 25  OT assess " skin, take and assess measurements.  Pt wearing light OTC compression stockings.   OT recommend pt transition to lymphedema grade compression stockings, educate pt on styles.  Pt tried short stretch  wraps though had difficulty, is not using.  OT recommend pt see fitter at [facility], gave pt info: 20-30 mmHg knee high stockings, open toe (pt preference) with silicone top band.  Will assess.  Home management:  Pt continues compression garments daily   Pt continues Manual lymph drainage (MLD) daily.  Pt monitors amount of sodium in diet.  Pt elevates B LE while sleeping in night and as able throughout the day.   Pt states completes issued exercises at home

## 2024-01-23 ENCOUNTER — APPOINTMENT (OUTPATIENT)
Dept: OCCUPATIONAL THERAPY | Facility: CLINIC | Age: 64
End: 2024-01-23
Payer: COMMERCIAL

## 2024-01-30 ENCOUNTER — TREATMENT (OUTPATIENT)
Dept: OCCUPATIONAL THERAPY | Facility: CLINIC | Age: 64
End: 2024-01-30
Payer: COMMERCIAL

## 2024-01-30 DIAGNOSIS — M79.672 BILATERAL LEG AND FOOT PAIN: ICD-10-CM

## 2024-01-30 DIAGNOSIS — M79.604 BILATERAL LEG AND FOOT PAIN: ICD-10-CM

## 2024-01-30 DIAGNOSIS — M79.605 BILATERAL LEG AND FOOT PAIN: ICD-10-CM

## 2024-01-30 DIAGNOSIS — M62.81 GENERALIZED MUSCLE WEAKNESS: ICD-10-CM

## 2024-01-30 DIAGNOSIS — M25.671 JOINT STIFFNESS OF BOTH ANKLES: ICD-10-CM

## 2024-01-30 DIAGNOSIS — M79.671 BILATERAL LEG AND FOOT PAIN: ICD-10-CM

## 2024-01-30 DIAGNOSIS — M25.672 JOINT STIFFNESS OF BOTH ANKLES: ICD-10-CM

## 2024-01-30 DIAGNOSIS — I89.0 LYMPHEDEMA OF BOTH LOWER EXTREMITIES: Primary | ICD-10-CM

## 2024-01-30 PROCEDURE — 97140 MANUAL THERAPY 1/> REGIONS: CPT | Mod: GO

## 2024-01-30 PROCEDURE — 97535 SELF CARE MNGMENT TRAINING: CPT | Mod: GO

## 2024-01-30 ASSESSMENT — ACTIVITIES OF DAILY LIVING (ADL): HOME_MANAGEMENT_TIME_ENTRY: 24

## 2024-01-30 NOTE — PROGRESS NOTES
"Occupational Therapy    Occupational Therapy Treatment    Name: Elisa Reyes  MRN: [id number]  : 1960  Date: 24  Visit #9 (#8 Medicare)  Time Calculation  Start Time: 1500  Stop Time: 1553  Time Calculation (min): 53 min    1. Lymphedema of both lower extremities        2. Bilateral leg and foot pain        3. Joint stiffness of both ankles        4. Generalized muscle weakness          Assessment:  BLE tissue texture softening post tx.    Pt report her legs \"feel lighter\".  Pt to transition to upgraded compression stockings.    Plan:  Continue OT for trunk, BLE lymphedema CDT- complete decongestive therapy, to decrease swelling, pain, tightness, improve functional mobility, instruct home management strategies. Transition to compression stockings, assess fit. See pt in 3-4 weeks.    Subjective   General:  Pt received her lymphedema pump (Lymphapress). Pt has been using it. Pt reports one of the air hoses is popping off. Pt told to call the company.     Objective      Lower Extremity (Skin Appearance/Condition and Girth):   Fibrotic edema distal lower legs, softer  Pitting edema   Hyperkeratosis creases ankles, mild toes   Hyperpigmentation pink lower legs, + Stemmer Sign bilateral 2nd toes   Additional Information:  tight tissue texture distal lower legs, fibrotic, hyperkeratotic, creases. Legs showing softening.     Leg Circumferential Measurements cm  RLE  Superior border of patella (SBP) 49.1  10 cm above SBP -  10cm below SBP 41.5  20cm below SBP 40.9  30cm below SBP 34.6  35cm below SBP 30.8  Ankle lobule 27.9  Ankle 26.1  Forefoot 22.7     LLE cm  Superior border of patella (SBP) 48.2  10 cm above SBP -  10cm below SBP 39.3  20cm below SBP 41.1  30cm below SBP 33.1  35cm below SBP 29.8  Ankle lobule 27.3  Ankle 25.5  Forefoot 23.2  Decreases noted.    Treatment:  53 minutes    Manual Therapy: 29  OT provide manual lymph drainage (MLD), bilat trunk, BLEs.    Gentle soft tissue techniques " distal lower legs, ankles with MLD.  Good tissue texture softening post tx.    Self Care: 24  OT assess skin, take and assess measurements.  Pt showing decreases both legs.  Pt wearing light OTC compression stockings.   OT recommend pt transition to lymphedema grade compression stockings, obtained physician prescription (20-30 mmHg, open toe, silicone top band).  Prescription given to pt. Pt to call temo [facility]om[facility] [place] [facility] for appointment.  OT to assess.  Pt using her Lymphapress pump 102 hours/day. Pt reports one air hose is popping off. OT recommend pt call company for them to remedy this. Pt reports she has their contact numbers.

## 2024-02-05 ENCOUNTER — APPOINTMENT (OUTPATIENT)
Dept: OCCUPATIONAL THERAPY | Facility: CLINIC | Age: 64
End: 2024-02-05
Payer: COMMERCIAL

## 2024-02-29 ENCOUNTER — TREATMENT (OUTPATIENT)
Dept: OCCUPATIONAL THERAPY | Facility: CLINIC | Age: 64
End: 2024-02-29
Payer: COMMERCIAL

## 2024-02-29 DIAGNOSIS — M62.81 GENERALIZED MUSCLE WEAKNESS: ICD-10-CM

## 2024-02-29 DIAGNOSIS — I89.0 LYMPHEDEMA OF BOTH LOWER EXTREMITIES: Primary | ICD-10-CM

## 2024-02-29 DIAGNOSIS — M79.671 BILATERAL LEG AND FOOT PAIN: ICD-10-CM

## 2024-02-29 DIAGNOSIS — M25.671 JOINT STIFFNESS OF BOTH ANKLES: ICD-10-CM

## 2024-02-29 DIAGNOSIS — M25.672 JOINT STIFFNESS OF BOTH ANKLES: ICD-10-CM

## 2024-02-29 DIAGNOSIS — M79.605 BILATERAL LEG AND FOOT PAIN: ICD-10-CM

## 2024-02-29 DIAGNOSIS — M79.672 BILATERAL LEG AND FOOT PAIN: ICD-10-CM

## 2024-02-29 DIAGNOSIS — M79.604 BILATERAL LEG AND FOOT PAIN: ICD-10-CM

## 2024-02-29 PROCEDURE — 97535 SELF CARE MNGMENT TRAINING: CPT | Mod: GO

## 2024-02-29 ASSESSMENT — ACTIVITIES OF DAILY LIVING (ADL): HOME_MANAGEMENT_TIME_ENTRY: 45

## 2024-02-29 NOTE — PROGRESS NOTES
Occupational Therapy    Occupational Therapy Treatment    Name: Elisa Reyes  MRN: [id number]  : 1960  Date: 24  Visit #10 (#9 Medicare)  Discharge  Time Calculation  Start Time: 1505  Stop Time: 1550  Time Calculation (min): 45 min    1. Lymphedema of both lower extremities        2. Bilateral leg and foot pain        3. Joint stiffness of both ankles        4. Generalized muscle weakness          Assessment:  Pt received new compression stockings, shows good fit, wearing them regularly.  Pt leg measurements show decreases since start of tx.  Pt following HEPs: MLD, uses pump, skin care, wears compression, exercises.  No further OT lymphedema tx indicated.  Pt agrees with discharge today.    LLIS = 22 on 10/9/2023  LLIS = 3 today (24)    Plan:  Discharge OT    Subjective   General:  Pt received her lymphedema pump (Lymphapress). Pt has been using it daily.  Hoses are good.  Pt received her new compression stockings, wore them into session. Pt said stockings comfortable.  Pt started back at gym, is exercising. Pt goes to [facility]. Pt wears compression with working out.     Objective      Lower Extremity (Skin Appearance/Condition and Girth):   Fibrotic edema distal lower legs, mild, softer  Pitting edema   Hyperkeratosis creases ankles, mild toes   Hyperpigmentation pink lower legs  + Stemmer Sign bilateral 2nd toes   Additional Information:  Legs showing softening.     Leg Circumferential Measurements cm  RLE  Superior border of patella (SBP) 48.6  10 cm above SBP -  10cm below SBP 41.6  20cm below SBP 40.7  30cm below SBP 33.1  35cm below SBP 29.6  Ankle lobule 26.8  Ankle 24.3  Forefoot 22.4     LLE cm  Superior border of patella (SBP) 47.9  10 cm above SBP -  10cm below SBP 39.5  20cm below SBP 40.9  30cm below SBP 31.9  35cm below SBP 28.1  Ankle lobule 27.3  Ankle 24.8  Forefoot 22.6  Decreases noted since last measurements 2024  Decreases noted since initial evaluation  10/9/2023    Treatment:  45 minutes    Self Care: 45  OT assess skin, take and assess measurements.  Pt obtained new compression stockings:  Pt fit by temo [facility] [place] [facility] [place] for appointment. Pt received [employer] 20-30 mmHg, open toe, silicone top band.  Good fit noted. Pt reports wearing regularly, that stockings were comfortable and controlling her swelling.  OT review pt HEPs: pt following MLD, compression, uses pump, increasing exercise.  Pt reports improved mobility and decreased leg/ankle tightness.